# Patient Record
Sex: FEMALE | Race: WHITE | Employment: UNEMPLOYED | URBAN - METROPOLITAN AREA
[De-identification: names, ages, dates, MRNs, and addresses within clinical notes are randomized per-mention and may not be internally consistent; named-entity substitution may affect disease eponyms.]

---

## 2019-07-09 ENCOUNTER — HOSPITAL ENCOUNTER (INPATIENT)
Facility: CLINIC | Age: 39
LOS: 3 days | Discharge: HOME | DRG: 897 | End: 2019-07-12
Attending: EMERGENCY MEDICINE | Admitting: PSYCHIATRY & NEUROLOGY
Payer: COMMERCIAL

## 2019-07-09 DIAGNOSIS — R79.89 ABNORMAL LFTS: ICD-10-CM

## 2019-07-09 DIAGNOSIS — F10.20 ALCOHOL DEPENDENCE, CONTINUOUS DRINKING BEHAVIOR (H): ICD-10-CM

## 2019-07-09 DIAGNOSIS — F10.229 ACUTE ALCOHOLIC INTOXICATION IN ALCOHOLISM WITH COMPLICATION, CONTINUOUS DRINKING BEHAVIOR (H): ICD-10-CM

## 2019-07-09 DIAGNOSIS — N39.0 URINARY TRACT INFECTION WITHOUT HEMATURIA, SITE UNSPECIFIED: Primary | ICD-10-CM

## 2019-07-09 PROBLEM — F10.10 ALCOHOL ABUSE: Status: ACTIVE | Noted: 2019-07-09

## 2019-07-09 LAB
ALBUMIN SERPL-MCNC: 4 G/DL (ref 3.4–5)
ALBUMIN UR-MCNC: 100 MG/DL
ALCOHOL BREATH TEST: 0.21 (ref 0–0.01)
ALCOHOL BREATH TEST: 0.31 (ref 0–0.01)
ALP SERPL-CCNC: 70 U/L (ref 40–150)
ALT SERPL W P-5'-P-CCNC: 150 U/L (ref 0–50)
AMPHETAMINES UR QL SCN: NEGATIVE
ANION GAP SERPL CALCULATED.3IONS-SCNC: 13 MMOL/L (ref 3–14)
APPEARANCE UR: ABNORMAL
AST SERPL W P-5'-P-CCNC: 231 U/L (ref 0–45)
BACTERIA #/AREA URNS HPF: ABNORMAL /HPF
BARBITURATES UR QL: NEGATIVE
BASOPHILS # BLD AUTO: 0 10E9/L (ref 0–0.2)
BASOPHILS NFR BLD AUTO: 1.3 %
BENZODIAZ UR QL: NEGATIVE
BILIRUB SERPL-MCNC: 0.4 MG/DL (ref 0.2–1.3)
BILIRUB UR QL STRIP: NEGATIVE
BUN SERPL-MCNC: 9 MG/DL (ref 7–30)
CALCIUM SERPL-MCNC: 8.9 MG/DL (ref 8.5–10.1)
CANNABINOIDS UR QL SCN: NEGATIVE
CHLORIDE SERPL-SCNC: 106 MMOL/L (ref 94–109)
CO2 SERPL-SCNC: 25 MMOL/L (ref 20–32)
COCAINE UR QL: NEGATIVE
COLOR UR AUTO: YELLOW
CREAT SERPL-MCNC: 0.64 MG/DL (ref 0.52–1.04)
DIFFERENTIAL METHOD BLD: ABNORMAL
EOSINOPHIL # BLD AUTO: 0.1 10E9/L (ref 0–0.7)
EOSINOPHIL NFR BLD AUTO: 2 %
ERYTHROCYTE [DISTWIDTH] IN BLOOD BY AUTOMATED COUNT: 13.3 % (ref 10–15)
ETHANOL UR QL SCN: POSITIVE
GFR SERPL CREATININE-BSD FRML MDRD: >90 ML/MIN/{1.73_M2}
GLUCOSE SERPL-MCNC: 115 MG/DL (ref 70–99)
GLUCOSE UR STRIP-MCNC: NEGATIVE MG/DL
HCG UR QL: NEGATIVE
HCT VFR BLD AUTO: 40.6 % (ref 35–47)
HGB BLD-MCNC: 13.9 G/DL (ref 11.7–15.7)
HGB UR QL STRIP: ABNORMAL
HYALINE CASTS #/AREA URNS LPF: 2 /LPF (ref 0–2)
IMM GRANULOCYTES # BLD: 0 10E9/L (ref 0–0.4)
IMM GRANULOCYTES NFR BLD: 0 %
KETONES UR STRIP-MCNC: NEGATIVE MG/DL
LEUKOCYTE ESTERASE UR QL STRIP: ABNORMAL
LYMPHOCYTES # BLD AUTO: 1 10E9/L (ref 0.8–5.3)
LYMPHOCYTES NFR BLD AUTO: 32.5 %
MCH RBC QN AUTO: 38.4 PG (ref 26.5–33)
MCHC RBC AUTO-ENTMCNC: 34.2 G/DL (ref 31.5–36.5)
MCV RBC AUTO: 112 FL (ref 78–100)
MONOCYTES # BLD AUTO: 0.4 10E9/L (ref 0–1.3)
MONOCYTES NFR BLD AUTO: 11.5 %
MUCOUS THREADS #/AREA URNS LPF: PRESENT /LPF
NEUTROPHILS # BLD AUTO: 1.6 10E9/L (ref 1.6–8.3)
NEUTROPHILS NFR BLD AUTO: 52.7 %
NITRATE UR QL: NEGATIVE
NRBC # BLD AUTO: 0 10*3/UL
NRBC BLD AUTO-RTO: 0 /100
OPIATES UR QL SCN: NEGATIVE
PH UR STRIP: 5.5 PH (ref 5–7)
PLATELET # BLD AUTO: 83 10E9/L (ref 150–450)
POTASSIUM SERPL-SCNC: 3.7 MMOL/L (ref 3.4–5.3)
PROT SERPL-MCNC: 7.9 G/DL (ref 6.8–8.8)
RBC # BLD AUTO: 3.62 10E12/L (ref 3.8–5.2)
RBC #/AREA URNS AUTO: 8 /HPF (ref 0–2)
SODIUM SERPL-SCNC: 144 MMOL/L (ref 133–144)
SOURCE: ABNORMAL
SP GR UR STRIP: 1.02 (ref 1–1.03)
SQUAMOUS #/AREA URNS AUTO: 50 /HPF (ref 0–1)
UROBILINOGEN UR STRIP-MCNC: NORMAL MG/DL (ref 0–2)
WBC # BLD AUTO: 3.1 10E9/L (ref 4–11)
WBC #/AREA URNS AUTO: 18 /HPF (ref 0–5)

## 2019-07-09 PROCEDURE — 80320 DRUG SCREEN QUANTALCOHOLS: CPT | Performed by: EMERGENCY MEDICINE

## 2019-07-09 PROCEDURE — 82075 ASSAY OF BREATH ETHANOL: CPT | Performed by: EMERGENCY MEDICINE

## 2019-07-09 PROCEDURE — 87086 URINE CULTURE/COLONY COUNT: CPT | Performed by: EMERGENCY MEDICINE

## 2019-07-09 PROCEDURE — 99285 EMERGENCY DEPT VISIT HI MDM: CPT | Performed by: EMERGENCY MEDICINE

## 2019-07-09 PROCEDURE — 87186 SC STD MICRODIL/AGAR DIL: CPT | Performed by: EMERGENCY MEDICINE

## 2019-07-09 PROCEDURE — 25000132 ZZH RX MED GY IP 250 OP 250 PS 637: Performed by: PSYCHIATRY & NEUROLOGY

## 2019-07-09 PROCEDURE — 99284 EMERGENCY DEPT VISIT MOD MDM: CPT | Mod: Z6 | Performed by: EMERGENCY MEDICINE

## 2019-07-09 PROCEDURE — 81001 URINALYSIS AUTO W/SCOPE: CPT | Performed by: EMERGENCY MEDICINE

## 2019-07-09 PROCEDURE — 81025 URINE PREGNANCY TEST: CPT | Performed by: EMERGENCY MEDICINE

## 2019-07-09 PROCEDURE — 12800008 ZZH R&B CD ADULT

## 2019-07-09 PROCEDURE — 87088 URINE BACTERIA CULTURE: CPT | Performed by: EMERGENCY MEDICINE

## 2019-07-09 PROCEDURE — 85025 COMPLETE CBC W/AUTO DIFF WBC: CPT | Performed by: EMERGENCY MEDICINE

## 2019-07-09 PROCEDURE — HZ2ZZZZ DETOXIFICATION SERVICES FOR SUBSTANCE ABUSE TREATMENT: ICD-10-PCS | Performed by: PSYCHIATRY & NEUROLOGY

## 2019-07-09 PROCEDURE — 80053 COMPREHEN METABOLIC PANEL: CPT | Performed by: EMERGENCY MEDICINE

## 2019-07-09 PROCEDURE — 80307 DRUG TEST PRSMV CHEM ANLYZR: CPT | Performed by: EMERGENCY MEDICINE

## 2019-07-09 RX ORDER — FOLIC ACID 1 MG/1
1 TABLET ORAL DAILY
Status: DISCONTINUED | OUTPATIENT
Start: 2019-07-10 | End: 2019-07-12 | Stop reason: HOSPADM

## 2019-07-09 RX ORDER — MULTIPLE VITAMINS W/ MINERALS TAB 9MG-400MCG
1 TAB ORAL DAILY
Status: DISCONTINUED | OUTPATIENT
Start: 2019-07-10 | End: 2019-07-12 | Stop reason: HOSPADM

## 2019-07-09 RX ORDER — ONDANSETRON 4 MG/1
4 TABLET, ORALLY DISINTEGRATING ORAL EVERY 6 HOURS PRN
Status: DISCONTINUED | OUTPATIENT
Start: 2019-07-09 | End: 2019-07-12 | Stop reason: HOSPADM

## 2019-07-09 RX ORDER — ACETAMINOPHEN 325 MG/1
650 TABLET ORAL EVERY 4 HOURS PRN
Status: DISCONTINUED | OUTPATIENT
Start: 2019-07-09 | End: 2019-07-12 | Stop reason: HOSPADM

## 2019-07-09 RX ORDER — CETIRIZINE HYDROCHLORIDE 10 MG/1
10 TABLET ORAL DAILY
Status: DISCONTINUED | OUTPATIENT
Start: 2019-07-09 | End: 2019-07-12 | Stop reason: HOSPADM

## 2019-07-09 RX ORDER — SPIRONOLACTONE 100 MG/1
100 TABLET, FILM COATED ORAL DAILY
COMMUNITY

## 2019-07-09 RX ORDER — TRAZODONE HYDROCHLORIDE 50 MG/1
50 TABLET, FILM COATED ORAL
Status: DISCONTINUED | OUTPATIENT
Start: 2019-07-09 | End: 2019-07-11

## 2019-07-09 RX ORDER — LANOLIN ALCOHOL/MO/W.PET/CERES
100 CREAM (GRAM) TOPICAL DAILY
Status: DISCONTINUED | OUTPATIENT
Start: 2019-07-10 | End: 2019-07-12 | Stop reason: HOSPADM

## 2019-07-09 RX ORDER — CETIRIZINE HYDROCHLORIDE 10 MG/1
10 TABLET ORAL DAILY PRN
COMMUNITY

## 2019-07-09 RX ORDER — HYDROXYZINE HYDROCHLORIDE 25 MG/1
25 TABLET, FILM COATED ORAL EVERY 4 HOURS PRN
Status: DISCONTINUED | OUTPATIENT
Start: 2019-07-09 | End: 2019-07-12 | Stop reason: HOSPADM

## 2019-07-09 RX ORDER — ATENOLOL 50 MG/1
50 TABLET ORAL DAILY PRN
Status: DISCONTINUED | OUTPATIENT
Start: 2019-07-09 | End: 2019-07-12 | Stop reason: HOSPADM

## 2019-07-09 RX ORDER — ALUMINA, MAGNESIA, AND SIMETHICONE 2400; 2400; 240 MG/30ML; MG/30ML; MG/30ML
30 SUSPENSION ORAL EVERY 4 HOURS PRN
Status: DISCONTINUED | OUTPATIENT
Start: 2019-07-09 | End: 2019-07-12 | Stop reason: HOSPADM

## 2019-07-09 RX ORDER — DIAZEPAM 5 MG
5-20 TABLET ORAL EVERY 30 MIN PRN
Status: DISCONTINUED | OUTPATIENT
Start: 2019-07-09 | End: 2019-07-12 | Stop reason: HOSPADM

## 2019-07-09 RX ADMIN — TRAZODONE HYDROCHLORIDE 50 MG: 50 TABLET ORAL at 23:11

## 2019-07-09 RX ADMIN — DIAZEPAM 10 MG: 5 TABLET ORAL at 19:44

## 2019-07-09 RX ADMIN — DIAZEPAM 10 MG: 5 TABLET ORAL at 23:11

## 2019-07-09 ASSESSMENT — ACTIVITIES OF DAILY LIVING (ADL)
TRANSFERRING: 0-->INDEPENDENT
HYGIENE/GROOMING: INDEPENDENT
DRESS: INDEPENDENT
BATHING: 0-->INDEPENDENT
LAUNDRY: WITH SUPERVISION
AMBULATION: 0-->INDEPENDENT
RETIRED_EATING: 0-->INDEPENDENT
DRESS: 0-->INDEPENDENT
ORAL_HYGIENE: INDEPENDENT
TOILETING: 0-->INDEPENDENT
FALL_HISTORY_WITHIN_LAST_SIX_MONTHS: NO
RETIRED_COMMUNICATION: 0-->UNDERSTANDS/COMMUNICATES WITHOUT DIFFICULTY
SWALLOWING: 0-->SWALLOWS FOODS/LIQUIDS WITHOUT DIFFICULTY
COGNITION: 0 - NO COGNITION ISSUES REPORTED
PRIOR_FUNCTIONAL_LEVEL_COMMENT: INDEPENDENT

## 2019-07-09 ASSESSMENT — ENCOUNTER SYMPTOMS
COLOR CHANGE: 0
FEVER: 0
CONFUSION: 0
HEADACHES: 0
SHORTNESS OF BREATH: 0
DIFFICULTY URINATING: 0
EYE REDNESS: 0
NECK STIFFNESS: 0
ABDOMINAL PAIN: 0
ARTHRALGIAS: 0

## 2019-07-09 NOTE — ED PROVIDER NOTES
History     Chief Complaint   Patient presents with     Alcohol Problem     seeking detox from alcohol, last drink last night on 7/8/19     HPI  Charmaine Francis is a 39 year old female who is seeking detox from alcohol.  Patient states that she had a year and a half sobriety until approximately 6 months ago when she relapsed on alcohol.  She is drinking several beers plus up to a half a bottle of bourbon per day.  She drinks every day and throughout the day over the past months.  She states that when she stops drinking, she becomes sweaty and developed significant shakes.  She states that 3 years ago she had an alcohol withdrawal seizure resulting in treatment and detox.  She denies use of other drugs currently.  She denies all medical issues as well as mental health issues.    I have reviewed the Medications, Allergies, Past Medical and Surgical History, and Social History in the Epic system.    Review of Systems   Constitutional: Negative for fever.   HENT: Negative for congestion.    Eyes: Negative for redness.   Respiratory: Negative for shortness of breath.    Cardiovascular: Negative for chest pain.   Gastrointestinal: Negative for abdominal pain.   Genitourinary: Negative for difficulty urinating.   Musculoskeletal: Negative for arthralgias and neck stiffness.   Skin: Negative for color change.   Neurological: Negative for headaches.   Psychiatric/Behavioral: Negative for confusion.       Physical Exam   BP: (!) 127/98  Heart Rate: 98  Temp: 97.7  F (36.5  C)  Resp: 16  Weight: 59.5 kg (131 lb 2 oz)  SpO2: 98 %      Physical Exam   Constitutional: She appears well-developed. She is sedated.   Smells strongly of alcohol     HENT:   Head: Normocephalic and atraumatic.   Eyes: Conjunctivae are normal. No scleral icterus. Right eye exhibits nystagmus. Left eye exhibits nystagmus.   Neck: Neck supple.   Cardiovascular: Regular rhythm.   Pulmonary/Chest: Breath sounds normal.   Abdominal: Soft. Bowel sounds are  normal. There is no hepatosplenomegaly. There is no tenderness.   Neurological: She is alert. She displays no tremor. A cranial nerve deficit (nystagmus noted bilateral lateral gaze) is present. She displays no seizure activity. Coordination and gait normal. GCS eye subscore is 4. GCS verbal subscore is 5. GCS motor subscore is 6.   Skin: Skin is warm, dry and intact.   Psychiatric: She has a normal mood and affect. Her speech is slurred. She is slowed.   Nursing note and vitals reviewed.      ED Course        Procedures             Medications - No data to display    Labs Ordered and Resulted from Time of ED Arrival Up to the Time of Departure from the ED   CBC WITH PLATELETS DIFFERENTIAL - Abnormal; Notable for the following components:       Result Value    WBC 3.1 (*)     RBC Count 3.62 (*)      (*)     MCH 38.4 (*)     Platelet Count 83 (*)     All other components within normal limits   COMPREHENSIVE METABOLIC PANEL - Abnormal; Notable for the following components:    Glucose 115 (*)      (*)      (*)     All other components within normal limits   UA MACROSCOPIC WITH REFLEX TO MICRO AND CULTURE - Abnormal; Notable for the following components:    Blood Urine Moderate (*)     Protein Albumin Urine 100 (*)     Leukocyte Esterase Urine Moderate (*)     RBC Urine 8 (*)     WBC Urine 18 (*)     Bacteria Urine Many (*)     Squamous Epithelial /HPF Urine 50 (*)     Mucous Urine Present (*)     All other components within normal limits   DRUG ABUSE SCREEN 6 CHEM DEP URINE ([facility]) - Abnormal; Notable for the following components:    Ethanol Qual Urine Positive (*)     All other components within normal limits   ALCOHOL BREATH TEST POCT - Abnormal; Notable for the following components:    Alcohol Breath Test 0.311 (*)     All other components within normal limits   HCG QUALITATIVE URINE   URINE CULTURE AEROBIC BACTERIAL            Assessments & Plan (with Medical Decision Making)   39-year-old  female presents requesting detox from alcohol.  Exam reveals that she is acutely intoxicated.  Laboratories revealed alcohol level up 0.311.  Urinalysis was obtained revealing abnormalities but is grossly contaminated with vaginal contents and is unreadable, will repeat when sober.  CBC reveals low white blood count and platelet count consistent with alcohol induced marrow toxicity.  Comprehensive metabolic panel reveals elevated glucose consistent with stress response as well as elevations in AST and ALT consistent with chronic alcohol abuse.  The case was discussed at length with chemical dependency intake and the patient will be admitted to detox for further evaluation and management.    I have reviewed the nursing notes.    I have reviewed the findings, diagnosis, plan and need for follow up with the patient.       Medication List      There are no discharge medications for this visit.         Final diagnoses:   Alcohol dependence, continuous drinking behavior (H)   Abnormal LFTs       7/9/2019   [facility], [place], EMERGENCY DEPARTMENT     Bernard Griffith MD  07/09/19 [id number]

## 2019-07-09 NOTE — PHARMACY-ADMISSION MEDICATION HISTORY
Admission medication history for the July 9, 2019 admission is complete.     Interview Sources:  Patient, outside med rec, care everywhere [facility]    Reliability of Source: Patient is a reliable historian    Medication Adherence: Poor - patient was nonadherent on her medications during EtOH relapse (see below for details)    Current Outpatient [facility]: [facility]    Changes made to PTA medication list (reason)  Added: Cetirizine 10 mg per pt report; Spironolactone 100 mg per pt report  Deleted: n/a  Changed: n/a    Additional medication history information:   Pt reports that while she was drinking, she had stopped taking several medications (spironolactone, potassium, calcium, glucosamine, apple cider vinegar, and green tea supplement). She intends to restart spironolactone for acne after discharge - she has [facility] in [facility] ref[facility].    Pt reports was previously on hyoscyamine 0.125 mg sl tab for IBS but found it did not work. Last dose 1 year ago. She reports that her IBS is currently untreated.     Prior to Admission Medication List:  Prior to Admission medications    Medication Sig Last Dose Taking? Auth Provider   cetirizine (ZYRTEC) 10 MG tablet Take 10 mg by mouth daily as needed for allergies Past Week Yes Unknown, Entered By History   spironolactone (ALDACTONE) 100 MG tablet Take 100 mg by mouth daily 5/2019  Unknown, Entered By History       Time spent: 15 minutes    Medication history completed by:   Sowmya Figueredo PharmD IV Student

## 2019-07-09 NOTE — ED NOTES
ED to Behavioral Floor Handoff    SITUATION  Charmaine Francis is a 39 year old female who speaks English and lives in a home with others The patient arrived in the ED by private car from home with a complaint of Alcohol Problem (seeking detox from alcohol, last drink last night on 7/8/19)  .The patient's current symptoms started/worsened 6 month(s) ago and during this time the symptoms have increased.   In the ED, pt was diagnosed with   Final diagnoses:   Alcohol dependence, continuous drinking behavior (H)   Abnormal LFTs        Initial vitals were: BP: (!) 127/98  Heart Rate: 98  Temp: 97.7  F (36.5  C)  Resp: 16  Weight: 59.5 kg (131 lb 2 oz)  SpO2: 98 %   --------  Is the patient diabetic? No   If yes, last blood glucose? --     If yes, was this treated in the ED? --  --------  Is the patient inebriated (ETOH) Yes or Impaired on other substances? No  MSSA done? No  Last MSSA score: --    Were withdrawal symptoms treated? No  Does the patient have a seizure history? Yes. If yes, date of most recent seizure--  --------  Is the patient patient experiencing suicidal ideation? denies current or recent suicidal ideation     Homicidal ideation? denies current or recent homicidal ideation or behaviors.    Self-injurious behavior/urges? denies current or recent self injurious behavior or ideation.  ------  Was pt aggressive in the ED No  Was a code called No  Is the pt now cooperative? Yes  -------  Meds given in ED: Medications - No data to display   Family present during ED course? Yes  Family currently present? Yes    BACKGROUND  Does the patient have a cognitive impairment or developmental disability? No  Allergies:   Allergies   Allergen Reactions     Penicillins      Sulfa Drugs    .   Social demographics are   Social History     Socioeconomic History     Marital status: Single     Spouse name: None     Number of children: None     Years of education: None     Highest education level: None   Occupational History      None   Social Needs     Financial resource strain: None     Food insecurity:     Worry: None     Inability: None     Transportation needs:     Medical: None     Non-medical: None   Tobacco Use     Smoking status: Current Some Day Smoker     Types: Cigarettes     Smokeless tobacco: Never Used     Tobacco comment: more frequent in the last 6 months   Substance and Sexual Activity     Alcohol use: Yes     Comment: beer, wine and bourbon     Drug use: No     Sexual activity: Not Currently     Partners: Male     Birth control/protection: None   Lifestyle     Physical activity:     Days per week: None     Minutes per session: None     Stress: None   Relationships     Social connections:     Talks on phone: None     Gets together: None     Attends Voodoo service: None     Active member of club or organization: None     Attends meetings of clubs or organizations: None     Relationship status: None     Intimate partner violence:     Fear of current or ex partner: None     Emotionally abused: None     Physically abused: None     Forced sexual activity: None   Other Topics Concern     Parent/sibling w/ CABG, MI or angioplasty before 65F 55M? Not Asked   Social History Narrative     None        ASSESSMENT  Labs results   Labs Ordered and Resulted from Time of ED Arrival Up to the Time of Departure from the ED   CBC WITH PLATELETS DIFFERENTIAL - Abnormal; Notable for the following components:       Result Value    WBC 3.1 (*)     RBC Count 3.62 (*)      (*)     MCH 38.4 (*)     Platelet Count 83 (*)     All other components within normal limits   COMPREHENSIVE METABOLIC PANEL - Abnormal; Notable for the following components:    Glucose 115 (*)      (*)      (*)     All other components within normal limits   UA MACROSCOPIC WITH REFLEX TO MICRO AND CULTURE - Abnormal; Notable for the following components:    Blood Urine Moderate (*)     Protein Albumin Urine 100 (*)     Leukocyte Esterase Urine  Moderate (*)     RBC Urine 8 (*)     WBC Urine 18 (*)     Bacteria Urine Many (*)     Squamous Epithelial /HPF Urine 50 (*)     Mucous Urine Present (*)     All other components within normal limits   DRUG ABUSE SCREEN 6 CHEM DEP URINE ([facility]) - Abnormal; Notable for the following components:    Ethanol Qual Urine Positive (*)     All other components within normal limits   ALCOHOL BREATH TEST POCT - Abnormal; Notable for the following components:    Alcohol Breath Test 0.311 (*)     All other components within normal limits   HCG QUALITATIVE URINE   URINE CULTURE AEROBIC BACTERIAL      Imaging Studies: No results found for this or any previous visit (from the past 24 hour(s)).   Most recent vital signs BP (!) 127/98   Temp 97.7  F (36.5  C) (Oral)   Resp 16   Wt 59.5 kg (131 lb 2 oz)   LMP 06/30/2019 (Approximate)   SpO2 98%   Breastfeeding? No   BMI 22.86 kg/m     Abnormal labs/tests/findings requiring intervention:---   Pain control: good  Nausea control: good    RECOMMENDATION  Are any infection precautions needed (MRSA, VRE, etc.)? No If yes, what infection? --  ---  Does the patient have mobility issues? independently. If yes, what device does the pt use? ---  ---  Is patient on 72 hour hold or commitment? No If on 72 hour hold, have hold and rights been given to patient? N/A  Are admitting orders written if after 10 p.m. ?N/A  Tasks needing to be completed:---     Charmaine finnegan--    [phone] [facility]   [phone] [facility]

## 2019-07-10 ENCOUNTER — APPOINTMENT (OUTPATIENT)
Dept: ULTRASOUND IMAGING | Facility: CLINIC | Age: 39
DRG: 897 | End: 2019-07-10
Attending: PHYSICIAN ASSISTANT
Payer: COMMERCIAL

## 2019-07-10 LAB
ALBUMIN SERPL-MCNC: 4 G/DL (ref 3.4–5)
ALP SERPL-CCNC: 58 U/L (ref 40–150)
ALT SERPL W P-5'-P-CCNC: 119 U/L (ref 0–50)
ANION GAP SERPL CALCULATED.3IONS-SCNC: 11 MMOL/L (ref 3–14)
AST SERPL W P-5'-P-CCNC: 147 U/L (ref 0–45)
BACTERIA SPEC CULT: ABNORMAL
BILIRUB SERPL-MCNC: 1 MG/DL (ref 0.2–1.3)
BUN SERPL-MCNC: 11 MG/DL (ref 7–30)
CALCIUM SERPL-MCNC: 8.9 MG/DL (ref 8.5–10.1)
CHLORIDE SERPL-SCNC: 104 MMOL/L (ref 94–109)
CHOLEST SERPL-MCNC: 303 MG/DL
CO2 SERPL-SCNC: 23 MMOL/L (ref 20–32)
CREAT SERPL-MCNC: 0.61 MG/DL (ref 0.52–1.04)
ERYTHROCYTE [DISTWIDTH] IN BLOOD BY AUTOMATED COUNT: 13 % (ref 10–15)
GFR SERPL CREATININE-BSD FRML MDRD: >90 ML/MIN/{1.73_M2}
GGT SERPL-CCNC: 158 U/L (ref 0–40)
GLUCOSE SERPL-MCNC: 68 MG/DL (ref 70–99)
HCT VFR BLD AUTO: 39.7 % (ref 35–47)
HDLC SERPL-MCNC: 167 MG/DL
HGB BLD-MCNC: 13.3 G/DL (ref 11.7–15.7)
INR PPP: 0.89 (ref 0.86–1.14)
LDLC SERPL CALC-MCNC: 126 MG/DL
Lab: ABNORMAL
MCH RBC QN AUTO: 37.9 PG (ref 26.5–33)
MCHC RBC AUTO-ENTMCNC: 33.5 G/DL (ref 31.5–36.5)
MCV RBC AUTO: 113 FL (ref 78–100)
NONHDLC SERPL-MCNC: 136 MG/DL
PLATELET # BLD AUTO: 66 10E9/L (ref 150–450)
POTASSIUM SERPL-SCNC: 3.8 MMOL/L (ref 3.4–5.3)
PROT SERPL-MCNC: 7.3 G/DL (ref 6.8–8.8)
RBC # BLD AUTO: 3.51 10E12/L (ref 3.8–5.2)
SODIUM SERPL-SCNC: 138 MMOL/L (ref 133–144)
SPECIMEN SOURCE: ABNORMAL
TRIGL SERPL-MCNC: 49 MG/DL
TSH SERPL DL<=0.005 MIU/L-ACNC: 2.48 MU/L (ref 0.4–4)
VIT B12 SERPL-MCNC: 733 PG/ML (ref 193–986)
WBC # BLD AUTO: 4 10E9/L (ref 4–11)

## 2019-07-10 PROCEDURE — 25000132 ZZH RX MED GY IP 250 OP 250 PS 637: Performed by: PHYSICIAN ASSISTANT

## 2019-07-10 PROCEDURE — 99222 1ST HOSP IP/OBS MODERATE 55: CPT | Mod: AI | Performed by: PSYCHIATRY & NEUROLOGY

## 2019-07-10 PROCEDURE — 36415 COLL VENOUS BLD VENIPUNCTURE: CPT | Performed by: PHYSICIAN ASSISTANT

## 2019-07-10 PROCEDURE — 80053 COMPREHEN METABOLIC PANEL: CPT | Performed by: PHYSICIAN ASSISTANT

## 2019-07-10 PROCEDURE — 76700 US EXAM ABDOM COMPLETE: CPT

## 2019-07-10 PROCEDURE — 82607 VITAMIN B-12: CPT | Performed by: PSYCHIATRY & NEUROLOGY

## 2019-07-10 PROCEDURE — 25000132 ZZH RX MED GY IP 250 OP 250 PS 637: Performed by: PSYCHIATRY & NEUROLOGY

## 2019-07-10 PROCEDURE — 84443 ASSAY THYROID STIM HORMONE: CPT | Performed by: PSYCHIATRY & NEUROLOGY

## 2019-07-10 PROCEDURE — 36415 COLL VENOUS BLD VENIPUNCTURE: CPT | Performed by: PSYCHIATRY & NEUROLOGY

## 2019-07-10 PROCEDURE — 99232 SBSQ HOSP IP/OBS MODERATE 35: CPT | Performed by: PHYSICIAN ASSISTANT

## 2019-07-10 PROCEDURE — 80061 LIPID PANEL: CPT | Performed by: PSYCHIATRY & NEUROLOGY

## 2019-07-10 PROCEDURE — 99207 ZZC CDG-HISTORY COMP: MEETS EXP. PROBLEM FOCUSED - DOWN CODED LACK OF HPI: CPT | Performed by: PHYSICIAN ASSISTANT

## 2019-07-10 PROCEDURE — 85027 COMPLETE CBC AUTOMATED: CPT | Performed by: PHYSICIAN ASSISTANT

## 2019-07-10 PROCEDURE — 82977 ASSAY OF GGT: CPT | Performed by: PSYCHIATRY & NEUROLOGY

## 2019-07-10 PROCEDURE — 85610 PROTHROMBIN TIME: CPT | Performed by: PHYSICIAN ASSISTANT

## 2019-07-10 PROCEDURE — 12800008 ZZH R&B CD ADULT

## 2019-07-10 RX ORDER — CALCIUM CARBONATE 500 MG/1
500 TABLET, CHEWABLE ORAL DAILY PRN
Status: DISCONTINUED | OUTPATIENT
Start: 2019-07-10 | End: 2019-07-12 | Stop reason: HOSPADM

## 2019-07-10 RX ORDER — LACTOBACILLUS RHAMNOSUS GG 10B CELL
1 CAPSULE ORAL DAILY
Status: DISCONTINUED | OUTPATIENT
Start: 2019-07-10 | End: 2019-07-12 | Stop reason: HOSPADM

## 2019-07-10 RX ORDER — LEVOFLOXACIN 750 MG/1
750 TABLET, FILM COATED ORAL DAILY
Status: DISCONTINUED | OUTPATIENT
Start: 2019-07-10 | End: 2019-07-12 | Stop reason: HOSPADM

## 2019-07-10 RX ADMIN — FOLIC ACID 1 MG: 1 TABLET ORAL at 08:28

## 2019-07-10 RX ADMIN — CALCIUM CARBONATE (ANTACID) CHEW TAB 500 MG 500 MG: 500 CHEW TAB at 16:37

## 2019-07-10 RX ADMIN — DIAZEPAM 10 MG: 5 TABLET ORAL at 12:01

## 2019-07-10 RX ADMIN — DIAZEPAM 10 MG: 5 TABLET ORAL at 20:27

## 2019-07-10 RX ADMIN — DIAZEPAM 10 MG: 5 TABLET ORAL at 09:25

## 2019-07-10 RX ADMIN — DIAZEPAM 10 MG: 5 TABLET ORAL at 04:55

## 2019-07-10 RX ADMIN — CETIRIZINE HYDROCHLORIDE 10 MG: 10 TABLET, FILM COATED ORAL at 08:28

## 2019-07-10 RX ADMIN — DIAZEPAM 10 MG: 5 TABLET ORAL at 08:28

## 2019-07-10 RX ADMIN — DIAZEPAM 10 MG: 5 TABLET ORAL at 22:09

## 2019-07-10 RX ADMIN — TRAZODONE HYDROCHLORIDE 50 MG: 50 TABLET ORAL at 22:34

## 2019-07-10 RX ADMIN — DIAZEPAM 10 MG: 5 TABLET ORAL at 18:21

## 2019-07-10 RX ADMIN — DIAZEPAM 10 MG: 5 TABLET ORAL at 14:19

## 2019-07-10 RX ADMIN — Medication 1 CAPSULE: at 16:37

## 2019-07-10 RX ADMIN — DIAZEPAM 10 MG: 5 TABLET ORAL at 16:38

## 2019-07-10 RX ADMIN — MULTIPLE VITAMINS W/ MINERALS TAB 1 TABLET: TAB at 08:28

## 2019-07-10 RX ADMIN — THIAMINE HCL TAB 100 MG 100 MG: 100 TAB at 08:28

## 2019-07-10 RX ADMIN — LEVOFLOXACIN 750 MG: 750 TABLET, FILM COATED ORAL at 16:38

## 2019-07-10 RX ADMIN — DIAZEPAM 20 MG: 5 TABLET ORAL at 10:59

## 2019-07-10 RX ADMIN — DIAZEPAM 10 MG: 5 TABLET ORAL at 00:51

## 2019-07-10 ASSESSMENT — ACTIVITIES OF DAILY LIVING (ADL)
HYGIENE/GROOMING: INDEPENDENT
ORAL_HYGIENE: INDEPENDENT
DRESS: STREET CLOTHES
LAUNDRY: WITH SUPERVISION

## 2019-07-10 NOTE — CONSULTS
[facility]  Internal Medicine Consult     Charmaine Francis MRN# [id number]   Age: 39 year old YOB: 1980     Date of Admission: 7/9/2019  Date of Consult: 7/10/2019    Primary Care Provider: [facility]    Requesting Service: Dr. Ang   Reason for Consult: General Medical Evaluation      SUBJECTIVE   CC:   Transaminitis    Assessment and Plan/Recommendations:   Charmaine Francis is a 39 year old female with history of depression, and substance abuse who was admitted to station 3A with acute etoh withdrawal    Depression, substance abuse: With acute alcohol withdrawal. Unclear quantity of etoh consumed daily. Relapsed 6 months ago  - Management per psych     Transaminitis: , . ALP and Tbili normal. AST 79, ALT 42 5/2016 via Care Everywhere  - Repeat CMP  - Obtain INR  - Abdominal US  ** Addendum: LFTs improving. US with steatosis. INR normal    Leukopenia, thrombocytopenia: WBC 6.0, plts 198 5/2018. Likely due to etoh with marrow suppression but given significant change in 1 year, concerning for cirrhosis   - Repeat CBC  **Addendum: WBC normalized. Plts down to 66  - Trend plts 7/11, may need peripheral smear if decreasing     Dirty UA: UA obtained in the ED, notable for moderate blood, moderate LE, 18 WBC, 8 RBC, bacteria, 50 squamous cells, mucous. No urinary symptoms. Has had UTIs in the past and feels as if she does not have a UTI at this time  - Follow UC  **Addendum: UC with e coli  - Start Levaquin based on allergy profile   - Probiotic     Medicine will continue to follow. Please contact if future questions or concerns arise. Thank you for the opportunity to be a part of this patient's care.      Karen Zhou  Internal Medicine [facility] Hospitalist  [phone]  July 10, 2019         HPI:   Charmaine Francis is a 39 year old female with history of depression, and substance abuse who was admitted to station 3A with acute etoh  "withdrawal    Patient reports feeling shaky, tired, aches. She is having issues with insomnia. Denies icterus, jaundice, RUQ pain, fever, chills. No confusion. Denies fever or chills. Reports h/o UTI x2 and does not thinks symptoms are c/w prior UTIs. No hematuria, dysuria, increased frequency/urgency. Denies CVAT. Takes spironolactone for acne but has not been taking lately.       Past Medical History:     Past Medical History:   Diagnosis Date     Seizures (H)      Substance abuse (H) 2015    Alcohol        Reviewed and updated in [employer].     Past Surgical History:    History reviewed. No pertinent surgical history.      Social History:   Tobacco use: Intermittent use in the past 6 months  Alcohol use: As above  Elicit drug use: Denies      Family History:     Family History   Problem Relation Age of Onset     Substance Abuse Father      Depression Maternal Grandmother          Allergies:     Allergies   Allergen Reactions     Penicillins      Sulfa Drugs          Medications:   Reviewed. Please see MAR     Review of Systems:   10 point ROS of systems including Constitutional, Eyes, Respiratory, Cardiovascular, Gastroenterology, Genitourinary, Integumentary, Muscularskeletal, Psychiatric were all negative except for pertinent positives noted in my HPI.    OBJECTIVE   Physical Exam:   Vitals were reviewed  Blood pressure (!) 134/100, pulse 89, temperature 97  F (36.1  C), temperature source Oral, resp. rate 16, height 1.6 m (5' 3\"), weight 59.5 kg (131 lb 2 oz), last menstrual period 06/30/2019, SpO2 98 %, not currently breastfeeding.  General: Alert and oriented x3, appears mildly uncomfortable   HEENT: Anicteric sclera, EOMI, membranes moist  Cardiovascular: RRR, S1S2. No murmur noted  Lungs: CTAB without wheezing or crackles   GI: Abdomen soft, non-tender with bowel sounds present. No guarding or rebound present  Vascular: no peripheral edema, distal pulses palpable  Neurologic: No focal deficits, CN II-XII " grossly intact. No lateral nystagmus with right lateral gaze and 1 beat with left lateral gaze. No up-down nystagmus  Neuropsychiatric: Per psych  Skin: No jaundice, rashes, or lesions        Data:        Lab Results   Component Value Date     07/09/2019    Lab Results   Component Value Date    CHLORIDE 106 07/09/2019    Lab Results   Component Value Date    BUN 9 07/09/2019      Lab Results   Component Value Date    POTASSIUM 3.7 07/09/2019    Lab Results   Component Value Date    CO2 25 07/09/2019    Lab Results   Component Value Date    CR 0.64 07/09/2019        Lab Results   Component Value Date    WBC 3.1 (L) 07/09/2019    HGB 13.9 07/09/2019    HCT 40.6 07/09/2019     (H) 07/09/2019    PLT 83 (L) 07/09/2019     Lab Results   Component Value Date    WBC 3.1 (L) 07/09/2019

## 2019-07-10 NOTE — PROVIDER NOTIFICATION
Notified Dr. Ang of pt receiving 60 mg po valium in 8 hours. Pt is in high level of alcohol detox and is showing no s/s of oversedation. She is alert and oriented, respiration are normal at 16 and she is up in her bed reading a book.

## 2019-07-10 NOTE — H&P
"[facility], [place]   Psychiatric History and Physical  Admission date: 7/9/2019        Chief Complaint:   \"Alcohol.\"        HPI:     The patient is a 40yo female with a history of alcohol use disorder who was admitted after drinking at least 5-6 drinks a day for 6 months. BAL was 0.311 on admission. The patient reports she is doing \"okay\" with the valium. Didn't sleep well last night. Was able to eat some. Denies SI or HI. Denies AH or VH. Not sure about CD treatment. Will consider medications for alcohol craving.          Past Psychiatric History:     Never been psychiatrically hospitalized.         Substance Use and History:     Alcohol use disorder. Does have a history of withdrawal seizures and DTs. Denies illicit drug use. Has been to detox once before. Never been to CD treatment.         Past Medical History:   PAST MEDICAL HISTORY:   Past Medical History:   Diagnosis Date     Seizures (H)      Substance abuse (H) 2015    Alcohol       PAST SURGICAL HISTORY: History reviewed. No pertinent surgical history.          Family History:   FAMILY HISTORY:   Family History   Problem Relation Age of Onset     Substance Abuse Father      Depression Maternal Grandmother            Social History:   Please see the full psychosocial profile from the clinical treatment coordinator.   SOCIAL HISTORY:   Social History     Tobacco Use     Smoking status: Current Some Day Smoker     Types: Cigarettes     Smokeless tobacco: Never Used     Tobacco comment: more frequent in the last 6 months   Substance Use Topics     Alcohol use: Yes     Comment: beer, wine and bourbon            Physical ROS:   The patient endorsed poor sleep. The remainder of 10-point review of systems was negative except as noted in HPI.         PTA Medications:     Medications Prior to Admission   Medication Sig Dispense Refill Last Dose     cetirizine (ZYRTEC) 10 MG tablet Take 10 mg by mouth daily as needed for allergies   Past " Week     spironolactone (ALDACTONE) 100 MG tablet Take 100 mg by mouth daily   5/2019          Allergies:     Allergies   Allergen Reactions     Penicillins      Sulfa Drugs           Labs:     Recent Results (from the past 48 hour(s))   Alcohol breath test POCT    Collection Time: 07/09/19  2:29 PM   Result Value Ref Range    Alcohol Breath Test 0.311 (A) 0.00 - 0.01   HCG qualitative urine    Collection Time: 07/09/19  2:45 PM   Result Value Ref Range    HCG Qual Urine Negative NEG^Negative   UA reflex to Microscopic and Culture    Collection Time: 07/09/19  2:45 PM   Result Value Ref Range    Color Urine Yellow     Appearance Urine Slightly Cloudy     Glucose Urine Negative NEG^Negative mg/dL    Bilirubin Urine Negative NEG^Negative    Ketones Urine Negative NEG^Negative mg/dL    Specific Gravity Urine 1.018 1.003 - 1.035    Blood Urine Moderate (A) NEG^Negative    pH Urine 5.5 5.0 - 7.0 pH    Protein Albumin Urine 100 (A) NEG^Negative mg/dL    Urobilinogen mg/dL Normal 0.0 - 2.0 mg/dL    Nitrite Urine Negative NEG^Negative    Leukocyte Esterase Urine Moderate (A) NEG^Negative    Source Unspecified Urine     RBC Urine 8 (H) 0 - 2 /HPF    WBC Urine 18 (H) 0 - 5 /HPF    Bacteria Urine Many (A) NEG^Negative /HPF    Squamous Epithelial /HPF Urine 50 (H) 0 - 1 /HPF    Mucous Urine Present (A) NEG^Negative /LPF    Hyaline Casts 2 0 - 2 /LPF   Drug abuse screen 6 urine (chem dep)    Collection Time: 07/09/19  2:45 PM   Result Value Ref Range    Amphetamine Qual Urine Negative NEG^Negative    Barbiturates Qual Urine Negative NEG^Negative    Benzodiazepine Qual Urine Negative NEG^Negative    Cannabinoids Qual Urine Negative NEG^Negative    Cocaine Qual Urine Negative NEG^Negative    Ethanol Qual Urine Positive (A) NEG^Negative    Opiates Qualitative Urine Negative NEG^Negative   Urine Culture Aerobic Bacterial    Collection Time: 07/09/19  2:45 PM   Result Value Ref Range    Specimen Description Unspecified Urine      "Special Requests Specimen received in preservative     Culture Micro PENDING    CBC with platelets differential    Collection Time: 07/09/19  3:02 PM   Result Value Ref Range    WBC 3.1 (L) 4.0 - 11.0 10e9/L    RBC Count 3.62 (L) 3.8 - 5.2 10e12/L    Hemoglobin 13.9 11.7 - 15.7 g/dL    Hematocrit 40.6 35.0 - 47.0 %     (H) 78 - 100 fl    MCH 38.4 (H) 26.5 - 33.0 pg    MCHC 34.2 31.5 - 36.5 g/dL    RDW 13.3 10.0 - 15.0 %    Platelet Count 83 (L) 150 - 450 10e9/L    Diff Method Automated Method     % Neutrophils 52.7 %    % Lymphocytes 32.5 %    % Monocytes 11.5 %    % Eosinophils 2.0 %    % Basophils 1.3 %    % Immature Granulocytes 0.0 %    Nucleated RBCs 0 0 /100    Absolute Neutrophil 1.6 1.6 - 8.3 10e9/L    Absolute Lymphocytes 1.0 0.8 - 5.3 10e9/L    Absolute Monocytes 0.4 0.0 - 1.3 10e9/L    Absolute Eosinophils 0.1 0.0 - 0.7 10e9/L    Absolute Basophils 0.0 0.0 - 0.2 10e9/L    Abs Immature Granulocytes 0.0 0 - 0.4 10e9/L    Absolute Nucleated RBC 0.0    Comprehensive metabolic panel    Collection Time: 07/09/19  3:02 PM   Result Value Ref Range    Sodium 144 133 - 144 mmol/L    Potassium 3.7 3.4 - 5.3 mmol/L    Chloride 106 94 - 109 mmol/L    Carbon Dioxide 25 20 - 32 mmol/L    Anion Gap 13 3 - 14 mmol/L    Glucose 115 (H) 70 - 99 mg/dL    Urea Nitrogen 9 7 - 30 mg/dL    Creatinine 0.64 0.52 - 1.04 mg/dL    GFR Estimate >90 >60 mL/min/[1.73_m2]    GFR Estimate If Black >90 >60 mL/min/[1.73_m2]    Calcium 8.9 8.5 - 10.1 mg/dL    Bilirubin Total 0.4 0.2 - 1.3 mg/dL    Albumin 4.0 3.4 - 5.0 g/dL    Protein Total 7.9 6.8 - 8.8 g/dL    Alkaline Phosphatase 70 40 - 150 U/L     (H) 0 - 50 U/L     (H) 0 - 45 U/L   Alcohol breath test POCT    Collection Time: 07/09/19  6:21 PM   Result Value Ref Range    Alcohol Breath Test 0.209 (A) 0.00 - 0.01          Physical and Psychiatric Examination:     /90   Pulse 80   Temp 97.1  F (36.2  C) (Oral)   Resp 16   Ht 1.6 m (5' 3\")   Wt 59.5 kg (131 " lb 2 oz)   LMP 06/30/2019 (Approximate)   SpO2 98%   Breastfeeding? No   BMI 23.23 kg/m    Weight is 131 lbs 2 oz  Body mass index is 23.23 kg/m .    Physical Exam:  I have reviewed the physical exam as documented by by the medical team and agree with findings and assessment and have no additional findings to add at this time.    Mental Status Exam:  Appearance: awake, alert and adequately groomed  Attitude:  cooperative  Eye Contact:  fair  Mood:  good  Affect:  intensity is blunted  Speech:  clear, coherent  Language: fluent and intact in English  Psychomotor, Gait, Musculoskeletal:  no evidence of tardive dyskinesia, dystonia, or tics  Thought Process:  linear  Associations:  no loose associations  Thought Content:  no evidence of suicidal ideation or homicidal ideation and no evidence of psychotic thought  Insight:  fair  Judgement:  fair  Oriented to:  time, person, and place  Attention Span and Concentration:  fair  Recent and Remote Memory:  fair  Fund of Knowledge:  appropriate         Admission Diagnoses:      Alcohol dependence, continuous drinking behavior (H)           Assessment & Plan:     1) Continue MSSA protocol.   2) Patient considering anti-craving medication or Antabuse.   3) Patient considering CD treatment.     Disposition Plan   Reason for ongoing admission: requires detoxification from substance that poses a risk of bodily harm during withdrawal period  Discharge location: TBD  Discharge Medications: not ordered  Follow-up Appointments: not scheduled  Legal Status: voluntary  Entered by: Henok Ang on 7/10/2019 at 8:02 AM

## 2019-07-10 NOTE — PROGRESS NOTES
"Pt asked what her plans for after detox are and she states, \"I don't know yet\". From chart review pt did have an assessment set for 7/2/19 that she cancelled. Pt is alert and oriented. She is in a high level of alcohol withdrawal today. MSSA scores today are 15, 15, 20 and 13. Administered 10, 10, 20 and 10 mg of valium so far today. She is highly tremulous so much so that she can barely hold a water pitcher to drink.  Pt was escorted to and from abdominal ultrasound today without incidence. She denies any suicidal ideation plans or intent. Denies anxiety, denies any depression endorses feeling hopeful.  "

## 2019-07-10 NOTE — PLAN OF CARE
Problem: Adult Behavioral Health Plan of Care  Goal: Team Discussion  Description  Team Plan:  Outcome: No Change  Note:   BEHAVIORAL TEAM DISCUSSION    Participants: Henok Ang MD; Filippo Mooney RN; Una PATEL  Progress: Initial assessment  Continued Stay Criteria/Rationale: Patient admitted due to alcohol abuse.   Medical/Physical: MSSA. Medicine continuing to follow: Transaminitis; Leukopenia, thrombocytopenia; Dirty UA  Precautions:   Behavioral Orders   Procedures    Code 2    Routine Programming     As clinically indicated    Seizure precautions    Status 15     Every 15 minutes.    Withdrawal precautions     Plan: The following services will be provided to the patient; psychiatric assessment, medication management, therapeutic milieu, individual and group support, and skills groups.   Rationale for change in precautions or plan: no change

## 2019-07-10 NOTE — PROGRESS NOTES
07/09/19 6307   Patient Belongings   Did you bring any home meds/supplements to the hospital?  No   Patient Belongings other (see comments)   Patient Belongings Put in Hospital Secure Location (Security or Locker, etc.) other (see comments)   Belongings Search Yes   Clothing Search Yes   Second Staff Stephanie Corey   storage bin: duffle bag, shoes, pants with string, tank top, toothbrush, chapstick, two sets of keys    Box in med room: phone, no wallet    Security env # [id number]: visa, passport    A               Admission:  I am responsible for any personal items that are not sent to the safe or pharmacy.  [facility] is not responsible for loss, theft or damage of any property in my possession.    Signature:  _________________________________ Date: _______  Time: _____                                              Staff Signature:  ____________________________ Date: ________  Time: _____      2nd Staff person, if patient is unable/unwilling to sign:    Signature: ________________________________ Date: ________  Time: _____     Discharge:  [facility] has returned all of my personal belongings:    Signature: _________________________________ Date: ________  Time: _____                                          Staff Signature:  ____________________________ Date: ________  Time: _____

## 2019-07-10 NOTE — PLAN OF CARE
SAVANAHPHILLY Francis is a 39 year old year old female with a chief complaint of Alcohol Problem         S = Situation:   Patient is a voluntary admission for alcohol withdrawal and detox    B  = Background:   Patient has been drinking 5-6 drinks daily for 6 months. Patient drinks beer, wine, and bourbon. Unable to quantify how much on a given day of each type of alcohol. BAC 0.311 upon admission to the ER. Patient denies any other substance abuse. Has only been to [facility] once for detox, after experiencing an alcohol withdrawal seizure. Denies having any history of DT's. Denies any inpatient mental health hospitalizations. Denies any formal CD treatment program attendance. Reports medical concerns as acne, and possible allergies. Gets shortness of breath with anxiety as well. Also endorses having difficulty sleeping, both falling asleep and staing asleep. Has a remote history of self-injurous behavior via cutting. No other concerns noted.  A  =  Assessment:   Patient affect full range, mood is anxious. Patient denies active SI, HI, SIB, or hallucinations. MSSA 8 during admission assessment.   R =   Request or Recommendation:   On-call physician notified of patient admission and admission orders obtained. Patient is on MSSA monitoring for alcohol withdrawal, with Valium. Is on withdrawal and seizure precautions. Patient's general medical and psychosocial well-being will be monitored. Interventions will be provided as needed and/or as ordered.

## 2019-07-10 NOTE — PROGRESS NOTES
Writer attempted to meet with pt regarding discharge plans. Pt in bed and appeared very shaky. Pt stated that she was very tried and just wanted to sleep.  Pt stated that she didn't know what she wanted to do upon discharge. Writer asked if she would consider getting a CD assessment so she could go to treatment.  Pt said she would think about it but right now she wanted to go back to sleep.  Writer agreed that case management would check in with her again tomorrow to talk further.

## 2019-07-11 LAB — PLATELET # BLD AUTO: 62 10E9/L (ref 150–450)

## 2019-07-11 PROCEDURE — 25000132 ZZH RX MED GY IP 250 OP 250 PS 637: Performed by: PSYCHIATRY & NEUROLOGY

## 2019-07-11 PROCEDURE — 12800008 ZZH R&B CD ADULT

## 2019-07-11 PROCEDURE — H2035 A/D TX PROGRAM, PER HOUR: HCPCS

## 2019-07-11 PROCEDURE — 36416 COLLJ CAPILLARY BLOOD SPEC: CPT | Performed by: PHYSICIAN ASSISTANT

## 2019-07-11 PROCEDURE — 25000132 ZZH RX MED GY IP 250 OP 250 PS 637: Performed by: PHYSICIAN ASSISTANT

## 2019-07-11 PROCEDURE — 85049 AUTOMATED PLATELET COUNT: CPT | Performed by: PHYSICIAN ASSISTANT

## 2019-07-11 PROCEDURE — 99232 SBSQ HOSP IP/OBS MODERATE 35: CPT | Performed by: PSYCHIATRY & NEUROLOGY

## 2019-07-11 RX ORDER — TRAZODONE HYDROCHLORIDE 50 MG/1
50-100 TABLET, FILM COATED ORAL
Status: DISCONTINUED | OUTPATIENT
Start: 2019-07-11 | End: 2019-07-12 | Stop reason: HOSPADM

## 2019-07-11 RX ADMIN — HYDROXYZINE HYDROCHLORIDE 25 MG: 25 TABLET ORAL at 20:26

## 2019-07-11 RX ADMIN — DIAZEPAM 10 MG: 5 TABLET ORAL at 04:39

## 2019-07-11 RX ADMIN — Medication 1 CAPSULE: at 08:44

## 2019-07-11 RX ADMIN — DIAZEPAM 10 MG: 5 TABLET ORAL at 08:44

## 2019-07-11 RX ADMIN — THIAMINE HCL TAB 100 MG 100 MG: 100 TAB at 08:44

## 2019-07-11 RX ADMIN — TRAZODONE HYDROCHLORIDE 50 MG: 50 TABLET ORAL at 00:07

## 2019-07-11 RX ADMIN — HYDROXYZINE HYDROCHLORIDE 25 MG: 25 TABLET ORAL at 12:48

## 2019-07-11 RX ADMIN — FOLIC ACID 1 MG: 1 TABLET ORAL at 08:44

## 2019-07-11 RX ADMIN — CETIRIZINE HYDROCHLORIDE 10 MG: 10 TABLET, FILM COATED ORAL at 08:44

## 2019-07-11 RX ADMIN — TRAZODONE HYDROCHLORIDE 100 MG: 50 TABLET ORAL at 23:08

## 2019-07-11 RX ADMIN — DIAZEPAM 10 MG: 5 TABLET ORAL at 00:07

## 2019-07-11 RX ADMIN — HYDROXYZINE HYDROCHLORIDE 25 MG: 25 TABLET ORAL at 16:22

## 2019-07-11 RX ADMIN — LEVOFLOXACIN 750 MG: 750 TABLET, FILM COATED ORAL at 08:44

## 2019-07-11 RX ADMIN — DIAZEPAM 10 MG: 5 TABLET ORAL at 12:47

## 2019-07-11 RX ADMIN — MULTIPLE VITAMINS W/ MINERALS TAB 1 TABLET: TAB at 08:44

## 2019-07-11 ASSESSMENT — ACTIVITIES OF DAILY LIVING (ADL)
DRESS: STREET CLOTHES
HYGIENE/GROOMING: INDEPENDENT
LAUNDRY: WITH SUPERVISION
HYGIENE/GROOMING: INDEPENDENT
ORAL_HYGIENE: INDEPENDENT

## 2019-07-11 NOTE — PROGRESS NOTES
met with Pt to discuss aftercare plans. She states several times she is not sure what she wants to do after detox.  She does not want any outpatient appointments scheduled.  She will make up he mind tomorrow.  Omar advised her that a CTC will check in with her tomorrow.

## 2019-07-11 NOTE — PLAN OF CARE
Problem: Adult Behavioral Health Plan of Care  Goal: Optimized Coping Skills in Response to Life Stressors  Outcome: No Change      Problem: Alcohol Withdrawal  Goal: Alcohol Withdrawal Symptom Control  Outcome: Improving      Pt continues to withdrawal from alcohol.  MSSA 19,12,12.  Isolative

## 2019-07-11 NOTE — PROGRESS NOTES
"Pt endorses anxiety today mostly related to what she states is a desire to discharge tomorrow. Explained to the patient the need to treat withdrawal from alcohol with medication and showed her that she has received a total of 180 mg of po valium since admission (120 mg valium yesterday, 40 mg today). MSSA scores today are 10 and 11. Pt denies any suicidal ideation plans or intent. Pt asked about post-detox plans \"I'll figure that out on my own\". States she has never been to treatment in her life. Encouraged her to have case management assist her with setting up a solid discharge plan \"I have pamphlets\". Seems more interested in discharging early from the hospital than completing her detox safely. Pt is still moderately tremulous but is improved since yesterday. She has been out in the lounge most of the shift reading a book.  "

## 2019-07-11 NOTE — PROGRESS NOTES
Brief Medicine Note:    IM following UC which is +for e coli sensitive to Levofloxacin. Continue Levofloxacin to complete course of antibiotics. Repeat platelet count stable at 62 (66) and likely low due to chronic marrow suppression in the setting of etoh abuse.   - Trend CBC with PCP within 1 week  - Medicine will sign off. Please contact with future questions or concerns     Karen Zhou PA-C  Internal Medicine [facility]  [phone]

## 2019-07-11 NOTE — PROGRESS NOTES
"[facility], [place]   Psychiatric Progress Note        Interim History:   The patient's care was discussed with the treatment team during the daily team meeting and/or staff's chart notes were reviewed.  Staff report patient is still in detox.     The patient reports that she feels \"much better.\" Is a little anxious but denies SI. Didn't sleep that well. Says that she really wants to go home tomorrow as there are \"things I need to do.\" Would not elaborate. Still considering Naltrexone or Antabuse.          Medications:       cetirizine  10 mg Oral Daily     folic acid  1 mg Oral Daily     lactobacillus rhamnosus (GG)  1 capsule Oral Daily     levofloxacin  750 mg Oral Daily     multivitamin w/minerals  1 tablet Oral Daily     vitamin B1  100 mg Oral Daily          Allergies:     Allergies   Allergen Reactions     Penicillins      Sulfa Drugs           Labs:     Recent Results (from the past 24 hour(s))   Comprehensive metabolic panel    Collection Time: 07/10/19 10:53 AM   Result Value Ref Range    Sodium 138 133 - 144 mmol/L    Potassium 3.8 3.4 - 5.3 mmol/L    Chloride 104 94 - 109 mmol/L    Carbon Dioxide 23 20 - 32 mmol/L    Anion Gap 11 3 - 14 mmol/L    Glucose 68 (L) 70 - 99 mg/dL    Urea Nitrogen 11 7 - 30 mg/dL    Creatinine 0.61 0.52 - 1.04 mg/dL    GFR Estimate >90 >60 mL/min/[1.73_m2]    GFR Estimate If Black >90 >60 mL/min/[1.73_m2]    Calcium 8.9 8.5 - 10.1 mg/dL    Bilirubin Total 1.0 0.2 - 1.3 mg/dL    Albumin 4.0 3.4 - 5.0 g/dL    Protein Total 7.3 6.8 - 8.8 g/dL    Alkaline Phosphatase 58 40 - 150 U/L     (H) 0 - 50 U/L     (H) 0 - 45 U/L   CBC with platelets    Collection Time: 07/10/19 10:53 AM   Result Value Ref Range    WBC 4.0 4.0 - 11.0 10e9/L    RBC Count 3.51 (L) 3.8 - 5.2 10e12/L    Hemoglobin 13.3 11.7 - 15.7 g/dL    Hematocrit 39.7 35.0 - 47.0 %     (H) 78 - 100 fl    MCH 37.9 (H) 26.5 - 33.0 pg    MCHC 33.5 31.5 - 36.5 g/dL    RDW 13.0 " "10.0 - 15.0 %    Platelet Count 66 (L) 150 - 450 10e9/L   INR    Collection Time: 07/10/19 10:53 AM   Result Value Ref Range    INR 0.89 0.86 - 1.14   Platelet count    Collection Time: 07/11/19  8:13 AM   Result Value Ref Range    Platelet Count 62 (L) 150 - 450 10e9/L          Psychiatric Examination:     /88   Pulse 106   Temp 97.9  F (36.6  C) (Tympanic)   Resp 16   Ht 1.6 m (5' 3\")   Wt 59.5 kg (131 lb 2 oz)   LMP 06/30/2019 (Approximate)   SpO2 97%   Breastfeeding? No   BMI 23.23 kg/m    Weight is 131 lbs 2 oz  Body mass index is 23.23 kg/m .  Orthostatic Vitals     None            Appearance: awake, alert and adequately groomed  Attitude:  cooperative  Eye Contact:  fair  Mood:  anxious and better  Affect:  mood congruent  Speech:  clear, coherent  Psychomotor Behavior:  no evidence of tardive dyskinesia, dystonia, or tics  Thought Process:  goal oriented  Associations:  no loose associations  Thought Content:  no evidence of suicidal ideation or homicidal ideation  Insight:  fair  Judgement:  fair  Oriented to:  time, person, and place  Attention Span and Concentration:  intact  Recent and Remote Memory:  fair    Clinical Global Impressions  First:  Considering your total clinical experience with this particular patient population, how severe are the patient's symptoms at this time?: 7 (07/10/19 0455)  Compared to the patient's condition at the START of treatment, this patient's condition is:: 4 (07/10/19 0455)  Most recent:  Considering your total clinical experience with this particular patient population, how severe are the patient's symptoms at this time?: 7 (07/10/19 0455)  Compared to the patient's condition at the START of treatment, this patient's condition is:: 4 (07/10/19 0455)         Precautions:     Behavioral Orders   Procedures     Code 2     Routine Programming     As clinically indicated     Seizure precautions     Status 15     Every 15 minutes.     Withdrawal precautions "          Diagnoses:     Alcohol dependence, continuous drinking behavior (H)             Assessment & Plan:      1) Continue MSSA protocol.   2) Patient considering anti-craving medication or Antabuse.   3) Patient considering CD treatment.       Disposition Plan   Reason for ongoing admission: requires detoxification from substance that poses a risk of bodily harm during withdrawal period  Discharge location: home with family  Discharge Medications: not ordered  Follow-up Appointments: not scheduled  Legal Status: voluntary  Entered by: Henok Ang on 7/11/2019 at 8:56 AM

## 2019-07-12 VITALS
OXYGEN SATURATION: 99 % | DIASTOLIC BLOOD PRESSURE: 87 MMHG | BODY MASS INDEX: 23.23 KG/M2 | SYSTOLIC BLOOD PRESSURE: 114 MMHG | TEMPERATURE: 97 F | HEART RATE: 75 BPM | WEIGHT: 131.13 LBS | HEIGHT: 63 IN | RESPIRATION RATE: 16 BRPM

## 2019-07-12 PROCEDURE — 25000132 ZZH RX MED GY IP 250 OP 250 PS 637: Performed by: PSYCHIATRY & NEUROLOGY

## 2019-07-12 PROCEDURE — 99207 ZZC NO CHARGE VISIT/PATIENT NOT SEEN: CPT | Performed by: PSYCHIATRY & NEUROLOGY

## 2019-07-12 PROCEDURE — 25000132 ZZH RX MED GY IP 250 OP 250 PS 637: Performed by: PHYSICIAN ASSISTANT

## 2019-07-12 RX ORDER — LEVOFLOXACIN 750 MG/1
750 TABLET, FILM COATED ORAL DAILY
Qty: 4 TABLET | Refills: 0 | Status: SHIPPED | OUTPATIENT
Start: 2019-07-12

## 2019-07-12 RX ORDER — NALTREXONE HYDROCHLORIDE 50 MG/1
50 TABLET, FILM COATED ORAL DAILY
Qty: 30 TABLET | Refills: 1 | Status: SHIPPED | OUTPATIENT
Start: 2019-07-12

## 2019-07-12 RX ORDER — HYDROXYZINE HYDROCHLORIDE 25 MG/1
25 TABLET, FILM COATED ORAL EVERY 4 HOURS PRN
Qty: 60 TABLET | Refills: 1 | Status: SHIPPED | OUTPATIENT
Start: 2019-07-12

## 2019-07-12 RX ORDER — LACTOBACILLUS RHAMNOSUS GG 10B CELL
1 CAPSULE ORAL DAILY
Qty: 4 CAPSULE | Refills: 0 | Status: SHIPPED | OUTPATIENT
Start: 2019-07-12

## 2019-07-12 RX ORDER — FOLIC ACID 1 MG/1
1 TABLET ORAL DAILY
Qty: 30 TABLET | Refills: 1 | Status: SHIPPED | OUTPATIENT
Start: 2019-07-12

## 2019-07-12 RX ORDER — TRAZODONE HYDROCHLORIDE 50 MG/1
50-100 TABLET, FILM COATED ORAL
Qty: 60 TABLET | Refills: 1 | Status: SHIPPED | OUTPATIENT
Start: 2019-07-12

## 2019-07-12 RX ORDER — MULTIPLE VITAMINS W/ MINERALS TAB 9MG-400MCG
1 TAB ORAL DAILY
Qty: 30 TABLET | Refills: 1 | Status: SHIPPED | OUTPATIENT
Start: 2019-07-12

## 2019-07-12 RX ADMIN — THIAMINE HCL TAB 100 MG 100 MG: 100 TAB at 07:53

## 2019-07-12 RX ADMIN — LEVOFLOXACIN 750 MG: 750 TABLET, FILM COATED ORAL at 07:53

## 2019-07-12 RX ADMIN — MULTIPLE VITAMINS W/ MINERALS TAB 1 TABLET: TAB at 07:53

## 2019-07-12 RX ADMIN — HYDROXYZINE HYDROCHLORIDE 25 MG: 25 TABLET ORAL at 00:28

## 2019-07-12 RX ADMIN — CETIRIZINE HYDROCHLORIDE 10 MG: 10 TABLET, FILM COATED ORAL at 07:53

## 2019-07-12 RX ADMIN — Medication 1 CAPSULE: at 07:53

## 2019-07-12 RX ADMIN — FOLIC ACID 1 MG: 1 TABLET ORAL at 07:53

## 2019-07-12 ASSESSMENT — ACTIVITIES OF DAILY LIVING (ADL)
LAUNDRY: WITH SUPERVISION
DRESS: STREET CLOTHES
ORAL_HYGIENE: INDEPENDENT
HYGIENE/GROOMING: INDEPENDENT

## 2019-07-12 NOTE — DISCHARGE INSTRUCTIONS
Behavioral Discharge Planning and Instructions  THANK YOU FOR CHOOSING THE [facility]  3A  [phone]    Summary: You were admitted to Station 3A on 7/9/2019 for detoxification from alcohol.  A medical exam was performed that included lab work. You have met with a  and opted to return home and work with your family on a recovery plan. Please take care and make your recovery a daily priority, Charmaine! It was a pleasure working with you and the entire treatment team here wishes you the very best in your recovery!     Recommendation: Complete a Rule 25 chemical dependency assessment and follow all recommendations.     Main Diagnoses:  Per Dr. Ang;  Alcohol dependence, continuous drinking behavior (H)    Major Treatments, Procedures and Findings:  You were treated for alcohol detoxification using valium administered based on the [facility] protocol. You did not complete a chemical dependency assessment. You had labs drawn and those results were reviewed with you. Please take a copy of your lab work with you to your next primary care physician appointment.    Symptoms to Report:  If you experience more anxiety, confusion, sleeplessness, deep sadness or thoughts of suicide, notify your treatment team or notify your primary care physician. IF ANY OF THE SYMPTOMS YOU ARE EXPERIENCING ARE A MEDICAL EMERGENCY CALL 911 IMMEDIATELY.     Lifestyle Adjustment: Adjust your lifestyle to get enough sleep, relaxation, exercise and good nutrition. Continue to develop healthy coping skills to decrease stress and promote a sober living environment. Do not use mood altering substances including alcohol, illegal drugs or addictive medications other than what is currently prescribed.     Disposition: Home    Follow-up Appointment:   No follow up appointment made as you declined to make an appointment at this time.     Rule 25 [facility]  You opted not to complete a Rule 25 chemical dependency  assessment while admitted to [address] [place] [address]. Below are some options to complete an assessment in the community.   [facility] - walk-in available  Address: [address] [place] [address]  Phone: [phone]    [facility] - walk-in available  Address: [address] [place] [address]  Phone: [phone]     [facility] - appointments only  Address: [facility] [address] [address]  Phone: [phone]    Resources:   AA/NA and Sponsors are excellent resources for support and you can find one at any support group meeting.   http://www.aastpaul.org (then click meetings) This for the [facility] AA meetings  http://a[place].org/meetings/   This is for the [facility] AA meetings  http://www.nam[place].us (then click find a  Meeting) This is for [facility] NA   https://aafairviewriverside.org/meetings  SMART Recovery - self management for addiction recovery:  www.goTaja.comrecovery.org  Pathways ~ A Health Crisis Resource & Support Center:  679.578.8261.  https://prescribetoprevent.org/patient-education/videos/  http://www.harmreduction.org  [facility] [phone]  Support Group:  AA/NA and Sponsor/support.  National [place] on Mental Illness (www.[place].doyle.org): 547.205.6539 or 431-830-2424.  Alcoholics Anonymous (www.alcoholics-anonymous.org): Check your phone book for your local chapter.  Suicide Awareness Voices of Education (SAVE) (www.save.org): 895-155-RNJR (1883)  National Suicide Prevention Line (www.mentalhealthmn.org): 296-812-RXTZ (9836)  Mental Health Consumer/Survivor Network of [place] (www.mhcsn.net): 195.306.3509 or 438-112-9643  Mental Health Association of [place] (www.mentalhealth.org): 933.583.6838 or 109-361-1017   Substance Abuse and Mental Health Services (www.samhsa.gov)    [place] Recovery [facility] (MRC)  [facility] connects people seeking recovery to resources that help foster and sustain long-term recovery.  Whether you are seeking  resources for treatment, transportation, housing, job training, education, health care or other pathways to recovery, [facility] is a great place to start.  771.141.8011.  www.[facility].org    General Medication Instructions:   See your medication sheet(s) for instructions.   Take all medications as prescribed.  Make no changes unless your doctor suggests them.   Go to all your doctor visits.  Be sure to have all your required lab tests. This way, your medicines can be refilled on time.  Do not use any forms of alcohol.    Please Note:  If you have any questions at anytime after you are discharged please call the [facility] [place] detox unit 3AW unit at [phone].  [facility] Behavioral Intake [phone]  Medical Records call [phone]  Outpatient Behavioral Intake call [phone]  LP+ Wait List/Bed Availability call [phone]    Please take this discharge folder with you to all your follow up appointments, it contains your lab results, diagnosis, medication list and discharge recommendations.      THANK YOU FOR CHOOSING THE [facility]

## 2019-07-12 NOTE — DISCHARGE SUMMARY
"Psychiatric Discharge Summary    Charmaine Francis MRN# [id number]   Age: 39 year old YOB: 1980     Date of Admission:  7/9/2019  Date of Discharge:  7/12/2019  2:19 PM  Admitting Physician:  Henok Ang MD  Discharge Physician:  Henok Ang MD          Event Leading to Hospitalization:   The patient is a 40yo female with a history of alcohol use disorder who was admitted after drinking at least 5-6 drinks a day for 6 months. BAL was 0.311 on admission. The patient reports she is doing \"okay\" with the valium. Didn't sleep well last night. Was able to eat some. Denies SI or HI. Denies AH or VH. Not sure about CD treatment. Will consider medications for alcohol craving.         See Admission note by Henok Ang MD for additional details.          DIagnoses:     Alcohol dependence, continuous drinking behavior (H)         Labs:        Lab Results   Component Value Date     07/10/2019    Lab Results   Component Value Date    CHLORIDE 104 07/10/2019    Lab Results   Component Value Date    BUN 11 07/10/2019      Lab Results   Component Value Date    POTASSIUM 3.8 07/10/2019    Lab Results   Component Value Date    CO2 23 07/10/2019    Lab Results   Component Value Date    CR 0.61 07/10/2019          Lab Results   Component Value Date    WBC 4.0 07/10/2019    HGB 13.3 07/10/2019    HCT 39.7 07/10/2019     (H) 07/10/2019    PLT 62 (L) 07/11/2019     Lab Results   Component Value Date     (H) 07/10/2019     (H) 07/10/2019     (H) 07/10/2019    ALKPHOS 58 07/10/2019    BILITOTAL 1.0 07/10/2019     Lab Results   Component Value Date    TSH 2.48 07/10/2019            Consults:   Consultation during this admission received from internal medicine:    Assessment and Plan/Recommendations:   Charmaine Francis is a 39 year old female with history of depression, and substance abuse who was admitted to station 3A with acute etoh withdrawal     Depression, " substance abuse: With acute alcohol withdrawal. Unclear quantity of etoh consumed daily. Relapsed 6 months ago  - Management per psych      Transaminitis: , . ALP and Tbili normal. AST 79, ALT 42 5/2016 via Care Everywhere  - Repeat CMP  - Obtain INR  - Abdominal US  ** Addendum: LFTs improving. US with steatosis. INR normal     Leukopenia, thrombocytopenia: WBC 6.0, plts 198 5/2018. Likely due to etoh with marrow suppression but given significant change in 1 year, concerning for cirrhosis   - Repeat CBC  **Addendum: WBC normalized. Plts down to 66  - Trend plts 7/11, may need peripheral smear if decreasing      Dirty UA: UA obtained in the ED, notable for moderate blood, moderate LE, 18 WBC, 8 RBC, bacteria, 50 squamous cells, mucous. No urinary symptoms. Has had UTIs in the past and feels as if she does not have a UTI at this time  - Follow UC  **Addendum: UC with e coli  - Start Levaquin based on allergy profile   - Probiotic            Hospital Course:   Charmaine Francis was admitted to Station 3A with attending Henok Ang MD as a voluntary patient. The patient was placed under status 15 (15 minute checks) to ensure patient safety.   MSSA protocol was initiated due to the patient's history of alcohol abuse and concern for withdrawal symptoms.  CBC, BMP and utox obtained.    All outpatient medications were continued. Naltrexone was provided at discharge.     Charmaine Francis did participate in groups and was visible in the milieu.     The patient's symptoms of withdrawal improved.     Charmaine Francis was released to home. At the time of discharge Charmaine Francsi was determined to not be a danger to herself or others. At the current time of discharge, the patient does not meet criteria for involuntary hospitalization. On the day of discharge, the patient reports that they do not have suicidal or homicidal ideation and would never hurt themselves or others. Steps taken to minimize  risk include: assessing patient s behavior and thought process daily during hospital stay, discharging patient with adequate plan for follow up for mental and physical health and discussing safety plan of returning to the hospital should the patient ever have thoughts of harming themselves or others. Therefore, based on all available evidence including the factors cited above, the patient does not appear to be at imminent risk for self-harm, and is appropriate for outpatient level of care.           Discharge Medications:     Current Discharge Medication List      START taking these medications    Details   folic acid (FOLVITE) 1 MG tablet Take 1 tablet (1 mg) by mouth daily  Qty: 30 tablet, Refills: 1    Associated Diagnoses: Alcohol dependence, continuous drinking behavior (H)      hydrOXYzine (ATARAX) 25 MG tablet Take 1 tablet (25 mg) by mouth every 4 hours as needed for anxiety  Qty: 60 tablet, Refills: 1    Associated Diagnoses: Alcohol dependence, continuous drinking behavior (H)      lactobacillus rhamnosus, GG, (CULTURELL) capsule Take 1 capsule by mouth daily  Qty: 4 capsule, Refills: 0    Associated Diagnoses: Urinary tract infection without hematuria, site unspecified      levofloxacin (LEVAQUIN) 750 MG tablet Take 1 tablet (750 mg) by mouth daily  Qty: 4 tablet, Refills: 0    Comments: Continue for four more days then stop.  Associated Diagnoses: Urinary tract infection without hematuria, site unspecified      multivitamin w/minerals (THERA-VIT-M) tablet Take 1 tablet by mouth daily  Qty: 30 tablet, Refills: 1    Associated Diagnoses: Alcohol dependence, continuous drinking behavior (H)      naltrexone (DEPADE/REVIA) 50 MG tablet Take 1 tablet (50 mg) by mouth daily  Qty: 30 tablet, Refills: 1    Associated Diagnoses: Alcohol dependence, continuous drinking behavior (H)      traZODone (DESYREL) 50 MG tablet Take 1-2 tablets ( mg) by mouth nightly as needed for sleep  Qty: 60 tablet, Refills: 1     Associated Diagnoses: Alcohol dependence, continuous drinking behavior (H)         CONTINUE these medications which have NOT CHANGED    Details   cetirizine (ZYRTEC) 10 MG tablet Take 10 mg by mouth daily as needed for allergies      spironolactone (ALDACTONE) 100 MG tablet Take 100 mg by mouth daily                  Psychiatric Examination:   The patient was not seen on the day of discharge.           Discharge Plan:   Continue medications as above.     Major Treatments, Procedures and Findings:  You were treated for alcohol detoxification using valium administered based on the [facility] protocol. You did not complete a chemical dependency assessment. You had labs drawn and those results were reviewed with you. Please take a copy of your lab work with you to your next primary care physician appointment.     Symptoms to Report:  If you experience more anxiety, confusion, sleeplessness, deep sadness or thoughts of suicide, notify your treatment team or notify your primary care physician. IF ANY OF THE SYMPTOMS YOU ARE EXPERIENCING ARE A MEDICAL EMERGENCY CALL 911 IMMEDIATELY.      Lifestyle Adjustment: Adjust your lifestyle to get enough sleep, relaxation, exercise and good nutrition. Continue to develop healthy coping skills to decrease stress and promote a sober living environment. Do not use mood altering substances including alcohol, illegal drugs or addictive medications other than what is currently prescribed.      Disposition: Home     Follow-up Appointment:   No follow up appointment made as you declined to make an appointment at this time.      Rule 25 [facility]  You opted not to complete a Rule 25 chemical dependency assessment while admitted to [address] [place] [address]. Below are some options to complete an assessment in the community.   [facility] - walk-in available  Address: [address] [place] [address]  Phone: [phone]     [facility] - walk-in available  Address: [address] [facility] [place] [address]  Phone: [phone]      [facility] - appointments only  Address: [address], [place] [address]  Phone: [phone]     Resources:   AA/NA and Sponsors are excellent resources for support and you can find one at any support group meeting.   http://www.aastpaul.org (then click meetings) This for the [facility] AA meetings  http://a[place].org/meetings/   This is for the [facility] AA meetings  http://www.nam[place]. (then click find a  Meeting) This is for [facility] NA   https://aafairviewriverside.org/meetings  SMART Recovery - self management for addiction recovery:  www.OpenHatchrecNBO TVy.org  Pathways ~ A Health Crisis Resource & Support Center:  723.603.3657.  https://prescribetoprevent.org/patient-education/videos/  http://www.harmreduction.org  [facility] 837-126-6550  Support Group:  AA/NA and Sponsor/support.  National [place] on Mental Illness (www.[place]oyle.org): 820.657.7999 or 203-959-8782.  Alcoholics Anonymous (www.alcoholics-anonymous.org): Check your phone book for your local chapter.  Suicide Awareness Voices of Education (SAVE) (www.save.org): 938-191-HLGW (7283)  National Suicide Prevention Line (www.mentalhealthmn.org): 316-300-VVUF (5526)  Mental Health Consumer/Survivor Network of [place] (www.mhcsn.net): 755.561.7724 or 249-567-6263  Mental Health Association [place] (www.mentalhealth.org): 475.819.8714 or 509-218-0857   Substance Abuse and Mental Health Services (www.samhsa.gov)    Attestation:  The patient was seen and evaluated by me. I spent less than 30 minutes on discharge day activities. Henok Ang MD

## 2019-07-12 NOTE — PROGRESS NOTES
"Behavioral Health  Note    Behavioral Health  Spirituality Group Note    UNIT 3AW    Name: Charmaine Francsi YOB: 1980   MRN: [id number] Age: 39 year old      Patient attended -led group, which included discussion of spirituality, coping with illness and building resilience.    Patient attended group for 1.0 hrs.    Alexandrea contributed to group occasionally, not elaborating much on her thoughts but demonstrating that she was taking in the content of the group. Group focus was on the spiritual skills of looking at life from a new point of view and \"lightening up\" our rigid thinking/emotional patterns.      Nidhi Sotelo MDiv, [facility]  Lead , Adult Behavioral Health  Pager [phone]          "

## 2019-07-12 NOTE — PLAN OF CARE
Patient has not required any valium for alcohol detox since yesterday 07/11/19 at 1247. All MSSA scores since that time have been less than 8. Pt is now removed from alcohol detox monitoring status. Await disposition likely to home later this afternoon.